# Patient Record
(demographics unavailable — no encounter records)

---

## 2025-01-31 NOTE — END OF VISIT
[Time Spent: ___ minutes] : I have spent [unfilled] minutes of time on the encounter which excludes teaching and separately reported services. negative normal affect

## 2025-01-31 NOTE — ASSESSMENT
[FreeTextEntry1] : This is a 78-year-old with complaints of acute lower back pan with radicular features into the right lower extremity. The pain shoots down the posterior right leg into his ankle. The pain started about 3 days ago and became intense. He suffered a fall about a month ago but notes that he was relatively pain free until recently. He denies any inciting event that caused the progression of pain. I will order a x-ray of the lumbar spine with flexion/extension views for further evaluation. In the interim, I recommend that he begin physical therapy for his lumbar spine. He will continue with Tylenol. I will also send Tizanidine 4 mg BID to the pharmacy for symptom control. We discussed trialing gabapentin, which he wishes to hold off on for now. He will follow up in 4 weeks for reassessment. If there is no improvement in his symptoms, we will obtain a CT scan of the lumbar spine rather than a MRI due to having metal plates in his body.  All of this patient's questions were answered and the conversation was understood well.  Will order a lumbar spine 4 view x-ray due to pain and decrease in range of motion in that area to delineate a pain generator.  Physical therapy of the lumbar spine 2-3 times a week for 4-8 weeks stressing a home exercise program of walking, shoulder girdle strengthening, swimming, elliptical , recumbent bike, Mario chi and Yoga. Use things that heat like hot shower or icy heat before rehab, exercising and at the beginning of the day, and ice (ice in a bag never directly on the skin) after activity and at the end of the day.  Entered by Marli Huggins, acting as scribe for Meera Cannon PA-C.   I, Meera Cannon, attest that this documentation has been prepared by Marli Huggins, under my presence and direction.

## 2025-01-31 NOTE — HISTORY OF PRESENT ILLNESS
[FreeTextEntry1] : This is a 78-year-old male presenting to the [facility] facility for complaints of right sided lower back pain with radicular features into the right lower extremity. The pain travels posteriorly down to the ankle. It does not travel into the foot.  His left side is asymptomatic. About a month ago, he suffered a fall straight on his back. He was relatively pain free until 3 days ago. He does not recall any recent inciting event. He is utilizing his wife's lumbar corset for support. The pain is more pronounced when he's walking. Pain settles down when sitting. He denies bladder or bowel dysfunction. Patient has AFib and is on a blood thinner; therefore, he cannot take a NSAID. Patient has been taking Tylenol for pain relief instead.

## 2025-02-26 NOTE — PHYSICAL EXAM
[Normal Coordination] : normal coordination [Normal DTR UE/LE] : normal DTR UE/LE  [Normal Sensation] : normal sensation [Normal Mood and Affect] : normal mood and affect [Oriented] : oriented [Able to Communicate] : able to communicate [Well Developed] : well developed [Well Nourished] : well nourished [] : non-antalgic

## 2025-02-26 NOTE — DATA REVIEWED
[FreeTextEntry1] : X-ray of the lumbar spine dated levoscoliosis with straightening of sagittal lordosis. Multilevel moderate to severe spondylosis L1-2 to L5-S1. No appreciable change in alignment of the spine when comparing neutral to flexion and extension views. No evidence of instability.

## 2025-02-26 NOTE — ASSESSMENT
[FreeTextEntry1] : This is a 78-year-old with complaints of acute lower back pan with radicular features into the right lower extremity. The pain shoots down the posterior right leg into his ankle. Recently, the pain started to travel into the right lower extremity down to the toes. The pain is severe and makes it difficult for him to perform his ADLs.  We will obtain a CT of the lumbar spine since he has hardware. In the interim, I will send Gabapentin 300 mg 1-2 tabs at night #60 to take as tolerated. He will follow up in 4 weeks to review imaging. In the interim, he will begin physical therapy. All this patients questions were answered and the conversation was understood well.  Lumbar CT was ordered to delineate spine pathology such as disc displacement and stenosis.  Physical therapy of the lumbar spine 2-3 times a week for 4-8 weeks stressing a home exercise program of walking, shoulder girdle strengthening, swimming, elliptical , recumbent bike, Mario chi and Yoga. Use things that heat like hot shower or icy heat before rehab, exercising and at the beginning of the day, and ice (ice in a bag never directly on the skin) after activity and at the end of the day.  Entered by Marli Huggins, acting as scribe for Joanne Suarez MD   I, Joanne Suarez MD attest that this documentation has been prepared by Marli Huggins, under my presence and direction.   Best Regards,     Joanne Suarez M.D., FAAPMR    Diplomate, American Board of Physical Medicine and Rehabilitation  Diplomate, American Board of Pain Medicine

## 2025-02-26 NOTE — HISTORY OF PRESENT ILLNESS
[FreeTextEntry1] : ORIGINAL PRESENTATION: This is a 78-year-old male presenting to the Walk[facility]in facility for complaints of right sided lower back pain with radicular features into the right lower extremity. The pain travels posteriorly down to the ankle. It does not travel into the foot.  His left side is asymptomatic. About a month ago, he suffered a fall straight on his back. He was relatively pain free until 3 days ago. He does not recall any recent inciting event. He is utilizing his wife's lumbar corset for support. The pain is more pronounced when he's walking. Pain settles down when sitting. He denies bladder or bowel dysfunction. Patient has AFib and is on a blood thinner; therefore, he cannot take a NSAID. Patient has been taking Tylenol for pain relief instead.   PATIENT PRESENTS FOR FOLLOW UP: He is under our care for complaints of lower back pain with radicular features into the right lower extremity. The pain travels posteriorly down to the ankle. The x-ray of the lumbar spine was reviewed with the patient. The pain continues to be severe and impacts his ability to perform his ADLs. He is not able to sit or stand for long periods of time. Sleeping is difficult secondary to pain. The pain starts in the right buttock and travels down to the toes. He has not started physical therapy as of yet. Patient state that he has plates in his lower back and is not able to undergo an MRI. I advised that a CT is needed before proceeding with injection therapy.   Of note, he is not a candidate for NSAIDs as he is on Xarelto.  
[FreeTextEntry1] : ORIGINAL PRESENTATION: This is a 78-year-old male presenting to the Walk[facility]in facility for complaints of right sided lower back pain with radicular features into the right lower extremity. The pain travels posteriorly down to the ankle. It does not travel into the foot.  His left side is asymptomatic. About a month ago, he suffered a fall straight on his back. He was relatively pain free until 3 days ago. He does not recall any recent inciting event. He is utilizing his wife's lumbar corset for support. The pain is more pronounced when he's walking. Pain settles down when sitting. He denies bladder or bowel dysfunction. Patient has AFib and is on a blood thinner; therefore, he cannot take a NSAID. Patient has been taking Tylenol for pain relief instead.   PATIENT PRESENTS FOR FOLLOW UP: He is under our care for complaints of lower back pain with radicular features into the right lower extremity. The pain travels posteriorly down to the ankle. The x-ray of the lumbar spine was reviewed with the patient. The pain continues to be severe and impacts his ability to perform his ADLs. He is not able to sit or stand for long periods of time. Sleeping is difficult secondary to pain. The pain starts in the right buttock and travels down to the toes. He has not started physical therapy as of yet. Patient state that he has plates in his lower back and is not able to undergo an MRI. I advised that a CT is needed before proceeding with injection therapy.   Of note, he is not a candidate for NSAIDs as he is on Xarelto.  
Detail Level: Zone
Detail Level: Generalized

## 2025-03-25 NOTE — DATA REVIEWED
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  Diffuse disc bulge spondylitic ridge at L5-S1 with marked right and moderate left foraminal compromise.  Mild congenial and acquired spinal stenosis at L2-3 and L3-4 with mild foraminal compromise.  Mild spinal stenosis at L1-2 with mild foraminal compromise.  Suspected 12 mm synovial cyst T12-L1 on the left impinging upon the thecal sac.  X-ray of the lumbar spine dated levoscoliosis with straightening of sagittal lordosis. Multilevel moderate to severe spondylosis L1-2 to L5-S1. No appreciable change in alignment of the spine when comparing neutral to flexion and extension views. No evidence of instability.

## 2025-03-25 NOTE — ASSESSMENT
[FreeTextEntry1] : This is a 78-year-old gentleman with complaints of acute lower back pan with radicular features into the right lower extremity. The pain shoots down the posterior right leg into his ankle. Recently, the pain started to travel into the right lower extremity down to the toes. Patient has been attending physical therapy 2 times per week for the last month which provides him with significant relief of his symptoms.  We will hold off on proceeding with injection therapy and he will continue with sessions.  He will follow-up in 2 months for reassessment. All this patients questions were answered and the conversation was understood well.  Physical therapy of the lumbar spine 2-3 times a week for 4-8 weeks stressing a home exercise program of walking, shoulder girdle strengthening, swimming, elliptical , recumbent bike, Mario chi and Yoga. Use things that heat like hot shower or icy heat before rehab, exercising and at the beginning of the day, and ice (ice in a bag never directly on the skin) after activity and at the end of the day.  Entered by Marli Huggins, acting as scribe for Joanne Suarez MD   I, Joanne Suarez MD attest that this documentation has been prepared by Marli Huggins, under my presence and direction.   Best Regards,     Joanne Suarez M.D., FAAPMR    Diplomate, American Board of Physical Medicine and Rehabilitation  Diplomate, American Board of Pain Medicine

## 2025-04-26 NOTE — ASSESSMENT
[FreeTextEntry1] : 78 y.o male here for evaluation of right elbow pain.  Patient reports an aching intermittent/episodic pain on the dorsal aspect of the right elbow/forearm that which worsens with lifting and pushing motions.  Denies any trauma or falls.  Denies any numbness or tingling.  Physical examination of the right elbow: full active range of motion of the right shoulder, wrist and fingers full active range of motion of the right elbow Tenderness to palpation of the lateral epicondyle and proximal extensor supinator mass pain with resisted wrist extension and forearm supination 4/5 strength in supination and wrist extension, otherwise 5/5 strength median/ulnar/radial sensation intact to light touch ain/pin/ulnar motor intact palpable pulses CR<2s  X-rays of the right elbow taken in the office today:  No acute fractures, subluxations, or dislocations. Osteoarthritic changes appreciated throughout.  The patient was advised of the diagnosis. The natural history of the pathology was explained in full to the patient in layman's terms. All questions were answered. The risks and benefits of surgical and non-surgical treatment alternatives were explained in full to the patient. We discussed treatment options including nsaids, topical gels, tennis elbow strap, PT, activity modification, cortisone inj, sx .pt is aware that symptoms usually resolve on their own in 95-99% of people, but the timeframe is unknown. Home exercises/stretches were demonstrated and the patient practiced them as well- They are to do the exercises hourly and hold the stretch for 30 seconds.  Avoid repetitive wrist flexion time was spent instructing the patient on appropriate placement of the elbow strap as well.  All questions and concerns addressed to patient's satisfaction. Patient expresses full understanding of treatment plan. I will have the patient follow-up with Dr. Reyes in 3 months for repeat evaluation and treatment.

## 2025-05-08 NOTE — DISCUSSION/SUMMARY
[de-identified] : Left knee pain follow-up  HPI Patient is a 78-year-old male reports to the office for subsequent reevaluation of his left knee pain.  He had a Synvisc 1 gel injection done in June 2024.  This has given him relief but pain has been acting up recently.  Denies any recent trauma or injury to the area.  Walking, range of motion, and palpating certain areas of the knee aggravate the patient's pain.  Denies any numbness or tingling.  Left knee exam is as follows: Mild effusion noted.  No erythema or ecchymosis.  Able to perform active straight leg raise.  Knee flexion from 0 to 110 degrees with mild stiffness and pain.  Patellofemoral, medial/lateral joint line tenderness to palpation.  Calf is soft and nontender.  Light touch intact throughout.  Mildly antalgic gait.  Previous left knee x-rays were reviewed and significant for degenerative/arthritic changes.  Assessment/plan With the patient's approval, and under sterile technique, I performed a cortisone injection today.  See the attached procedure note for further details.  The patient was informed that their next cortisone injection could not be until a minimum of three months from today's date and the patient understands.  Explained to the patient that the full effect of the injection will take 3-5 days to kick in.   Patient would clinically benefit from another round of viscosupplementation injection as this has given him relief in the past.  Left knee Synvisc 1 gel injection ordered so he may receive that at his next visit.  Follow-up in 6 weeks.  All questions/concerns were answered in detail.

## 2025-06-26 NOTE — DISCUSSION/SUMMARY
[de-identified] : Left knee Synvisc-one gel injection  HPI Patient is a 78-year old male who reports to office for subsequent reevaluation of knee pain.  Here to receive gel injection.  Knee exam is as follows: No effusion noted.  No erythema or ecchymosis.  Able to perform active straight leg raise.  Knee flexion from 0 to 110 degrees.  Calf is soft and nontender.  Light touch intact throughout.  Nonantalgic gait.  Assessment/plan With the patient's approval, the left knee Synvisc-one gel injection was performed in the office today.  See the attached procedure note for further details.  Explained to the patient that the full effect of the medication may take up to 6 weeks for it to kick in.  The patient was advised to rest/ice the area and can alternate with warm compresses.  Instructed not to do any strenuous activity that would further aggravate their symptoms.  Patient will follow-up in 5 months for further evaluation.  All of the patient's questions/concerns were answered in detail.

## 2025-06-26 NOTE — PROCEDURE
[Large Joint Injection] : Large joint injection [Left] : of the left [Knee] : knee [X-ray evidence of Osteoarthritis on this or prior visit] : x-ray evidence of Osteoarthritis on this or prior visit [Alcohol] : alcohol [Ethyl Chloride sprayed topically] : ethyl chloride sprayed topically [Sterile technique used] : sterile technique used [Synvisc-one (48mg)] : 48mg of Synvisc-one [] : Patient tolerated procedure well [Call if redness, pain or fever occur] : call if redness, pain or fever occur [Apply ice for 15min out of every hour for the next 12-24 hours as tolerated] : apply ice for 15 minutes out of every hour for the next 12-24 hours as tolerated [Risks, benefits, alternatives discussed / Verbal consent obtained] : the risks benefits, and alternatives have been discussed, and verbal consent was obtained

## 2025-06-28 NOTE — PROCEDURE
[Left] : of the left [Knee] : knee [Alcohol] : alcohol [Ethyl Chloride sprayed topically] : ethyl chloride sprayed topically [Sterile technique used] : sterile technique used [Effusion] : effusion [Cell count/dif] : cell count/dif [GS] : GS [Culture] : culture [Crystals] : crystals [] : Patient tolerated procedure well [Call if redness, pain or fever occur] : call if redness, pain or fever occur [Apply ice for 15min out of every hour for the next 12-24 hours as tolerated] : apply ice for 15 minutes out of every hour for the next 12-24 hours as tolerated [Risks, benefits, alternatives discussed / Verbal consent obtained] : the risks benefits, and alternatives have been discussed, and verbal consent was obtained [de-identified] : 115 cc [de-identified] : Synovial fluid

## 2025-06-28 NOTE — DISCUSSION/SUMMARY
[de-identified] : Left knee pain follow-up  HPI Patient is a 78-year-old male who reports to office for subsequent reevaluation of his left knee pain.  He had a Synvisc-one gel injection done two days ago.  He states that pain has worsened and that his knee pain has become severely swollen after the injection.  Walking, range of motion, and palpating certain areas aggravate the patient's pain.  Denies any numbness or tingling.  Left knee exam is as follows: Severe effusion noted.  No erythema or ecchymosis.  Able to perform active straight leg raise.  Knee flexion from 0 to 90 degrees with stiffness and pain.  Patellofemoral, medial/lateral joint line tenderness palpation.  Calf soft and nontender.  Light touch intact throughout.  Antalgic gait.  Assessment/plan Explained to the patient that he clinically developed Synvisc synovitis.  I advised that we not use this specific type of gel injection again in the future if he is interested again.    With the patient's approval, and under sterile technique, a knee aspiration was performed in office today.  See the attached procedure note for further details.  Synovial fluid was sent to the lab for further evaluation.  Left knee was Ace wrap for support/stability.  He may continue to weight-bear as tolerated.  OTC Tylenol as needed for pain.  The patient was advised to rest/ice the area and may alternate with warm compresses as needed.  Explained that we cannot re- aspirate his knee for at least another 2 weeks if his knee swells up again.  Follow-up in 3 to 4 months for further evaluation.  I advised patient to call the office if he has any issues.

## 2025-07-01 NOTE — DISCUSSION/SUMMARY
[de-identified] : Left knee pain follow-up  HPI Patient is a 78-year-old male who reports to office for subsequent reevaluation of his left knee pain. He had a Synvisc-one gel injection done on 6/26/2025.  He then came in on 6/28/2025 because his knee swelled up after the injection.  He had a knee aspiration done on 6/28/2025.  He states that the knee has swelled up again and that he has noticed abrasions and slight redness on his knee now. Walking, range of motion, and palpating certain areas aggravate the patient's pain. Denies any numbness or tingling.  Denies any fever or chills.  Left knee exam is as follows: moderate effusion noted. No erythema or ecchymosis.  2 abrasions noted on anterior aspect of knee, not where the injection or aspiration site occurred.  Able to perform active straight leg raise. Knee flexion from 0 to 90 degrees with stiffness and pain. Patellofemoral, medial/lateral joint line tenderness palpation. Calf soft and nontender. Light touch intact throughout. Antalgic gait.  Assessment/plan Explained to the patient that he clinically developed Synvisc synovitis. I advised that we not use this specific type of gel injection again in the future if he is interested again.  Patient clinically may have a cellulitis.  He has abrasions over the anterior aspect of his left knee.  Augmentin 875-125 mg Rx sent to pharmacy.  He may cover the abrasions with regular Band-Aids.  advised patient to call the office if he has any issues.  OTC Tylenol as needed for pain. The patient was advised to rest/ice the area and may alternate with warm compresses as needed. Explained that we cannot re- aspirate his knee for at least another 2 weeks if his knee swells up again.  Follow-up in 2 weeks for further evaluation.

## 2025-07-01 NOTE — DISCUSSION/SUMMARY
[de-identified] : Left knee pain follow-up  HPI Patient is a 78-year-old male who reports to office for subsequent reevaluation of his left knee pain. He had a Synvisc-one gel injection done on 6/26/2025.  He then came in on 6/28/2025 because his knee swelled up after the injection.  He had a knee aspiration done on 6/28/2025.  He states that the knee has swelled up again and that he has noticed abrasions and slight redness on his knee now. Walking, range of motion, and palpating certain areas aggravate the patient's pain. Denies any numbness or tingling.  Denies any fever or chills.  Left knee exam is as follows: moderate effusion noted. No erythema or ecchymosis.  2 abrasions noted on anterior aspect of knee, not where the injection or aspiration site occurred.  Able to perform active straight leg raise. Knee flexion from 0 to 90 degrees with stiffness and pain. Patellofemoral, medial/lateral joint line tenderness palpation. Calf soft and nontender. Light touch intact throughout. Antalgic gait.  Assessment/plan Explained to the patient that he clinically developed Synvisc synovitis. I advised that we not use this specific type of gel injection again in the future if he is interested again.  Patient clinically may have a cellulitis.  He has abrasions over the anterior aspect of his left knee.  Augmentin 875-125 mg Rx sent to pharmacy.  He may cover the abrasions with regular Band-Aids.  advised patient to call the office if he has any issues.  OTC Tylenol as needed for pain. The patient was advised to rest/ice the area and may alternate with warm compresses as needed. Explained that we cannot re- aspirate his knee for at least another 2 weeks if his knee swells up again.  Follow-up in 2 weeks for further evaluation.

## 2025-07-17 NOTE — ASSESSMENT
[FreeTextEntry1] : Recovering well from the gel injection response feels about 50% better would consider repeating it towards the end of the calendar year he is in A-fib is on Eliquis we did discuss the possibility eventually of knee replacement Heat light exercise knee sleeve Tylenol

## 2025-07-17 NOTE — REASON FOR VISIT
[FreeTextEntry2] : patient is here today for left knee pain feeling 50% better even with the reaction from the gel took some oral antibiotics finished

## 2025-07-17 NOTE — HISTORY OF PRESENT ILLNESS
[de-identified] : Patient is a 78-year old male who reports to office for subsequent reevaluation of knee pain. Here to receive gel injection.  Knee exam is as follows: No effusion noted. No erythema or ecchymosis. Able to perform active straight leg raise. Knee flexion from 0 to 110 degrees. Calf is soft and nontender. Light touch intact throughout. Nonantalgic gait.

## 2025-07-17 NOTE — HISTORY OF PRESENT ILLNESS
[de-identified] : Patient is a 78-year old male who reports to office for subsequent reevaluation of knee pain. Here to receive gel injection.  Knee exam is as follows: No effusion noted. No erythema or ecchymosis. Able to perform active straight leg raise. Knee flexion from 0 to 110 degrees. Calf is soft and nontender. Light touch intact throughout. Nonantalgic gait.